# Patient Record
Sex: FEMALE | Race: WHITE | Employment: PART TIME | URBAN - METROPOLITAN AREA
[De-identification: names, ages, dates, MRNs, and addresses within clinical notes are randomized per-mention and may not be internally consistent; named-entity substitution may affect disease eponyms.]

---

## 2018-09-03 ENCOUNTER — HOSPITAL ENCOUNTER (EMERGENCY)
Facility: CLINIC | Age: 38
Discharge: HOME | End: 2018-09-03
Attending: EMERGENCY MEDICINE | Admitting: EMERGENCY MEDICINE
Payer: COMMERCIAL

## 2018-09-03 VITALS
OXYGEN SATURATION: 98 % | SYSTOLIC BLOOD PRESSURE: 100 MMHG | TEMPERATURE: 98.4 F | HEART RATE: 109 BPM | DIASTOLIC BLOOD PRESSURE: 69 MMHG | RESPIRATION RATE: 16 BRPM

## 2018-09-03 DIAGNOSIS — M54.16 LUMBAR RADICULOPATHY: ICD-10-CM

## 2018-09-03 PROCEDURE — 96372 THER/PROPH/DIAG INJ SC/IM: CPT

## 2018-09-03 PROCEDURE — 99284 EMERGENCY DEPT VISIT MOD MDM: CPT | Mod: 25

## 2018-09-03 PROCEDURE — 25000128 H RX IP 250 OP 636: Performed by: EMERGENCY MEDICINE

## 2018-09-03 PROCEDURE — 25000132 ZZH RX MED GY IP 250 OP 250 PS 637: Performed by: EMERGENCY MEDICINE

## 2018-09-03 RX ORDER — KETOROLAC TROMETHAMINE 30 MG/ML
30 INJECTION, SOLUTION INTRAMUSCULAR; INTRAVENOUS ONCE
Status: COMPLETED | OUTPATIENT
Start: 2018-09-03 | End: 2018-09-03

## 2018-09-03 RX ORDER — OXYCODONE HYDROCHLORIDE 5 MG/1
10 TABLET ORAL ONCE
Status: COMPLETED | OUTPATIENT
Start: 2018-09-03 | End: 2018-09-03

## 2018-09-03 RX ORDER — OXYCODONE AND ACETAMINOPHEN 5; 325 MG/1; MG/1
1-2 TABLET ORAL EVERY 4 HOURS PRN
Qty: 5 TABLET | Refills: 0 | Status: SHIPPED | OUTPATIENT
Start: 2018-09-03

## 2018-09-03 RX ADMIN — KETOROLAC TROMETHAMINE 30 MG: 30 INJECTION, SOLUTION INTRAMUSCULAR at 14:03

## 2018-09-03 RX ADMIN — OXYCODONE HYDROCHLORIDE 10 MG: 5 TABLET ORAL at 15:08

## 2018-09-03 ASSESSMENT — ENCOUNTER SYMPTOMS
WEAKNESS: 1
BACK PAIN: 1
FEVER: 0
NUMBNESS: 0
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
## 2018-09-03 NOTE — ED AVS SNAPSHOT
[facility] Emergency Department    [address]    [facility] [address]    Phone:  [phone]    Fax:  [phone]                                       Audrey Cobian   MRN: [id number]    Department:  [facility] Emergency Department   Date of Visit:  9/3/2018           Patient Information     Date Of Birth          1980        Your diagnoses for this visit were:     Lumbar radiculopathy        You were seen by Sourav Ken MD.      Follow-up Information     Follow up with [facility] Emergency Department.    Specialty:  EMERGENCY MEDICINE    Why:  As needed, If symptoms worsen    Contact information:    [address]  [facility] [address] [phone]        Follow up with Ana Alvarado Schedule an appointment as soon as possible for a visit in 1 day.    Specialty:  Nurse Practitioner    Contact information:    [facility]  [address] [facility] [address]  [phone]          Discharge Instructions       Discharge Instructions  Back Pain  You were seen today for back pain. Back pain can have many causes, but most will get better without surgery or other specific treatment. Sometimes there is a herniated ( slipped ) disc. We do not usually do MRI scans to look for these right away, since most herniated discs will get better on their own with time.  Today, we did not find any evidence that your back pain was caused by a serious condition. However, sometimes symptoms develop over time and cannot be found during an emergency visit, so it is very important that you follow up with your primary provider.  Generally, every Emergency Department visit should have a follow-up clinic visit with either a primary or a specialty clinic/provider. Please follow-up as instructed by your emergency provider today.    Return to the Emergency Department if:    You develop a fever with your back pain.     You have weakness or  change in sensation in one or both legs.    You lose control of your bowels or bladder, or cannot empty your bladder (cannot pee).    Your pain gets much worse.     Follow-up with your provider:    Unless your pain has completely gone away, please make an appointment with your provider within one week. Most of the routine care for back pain is available in a clinic and not the Emergency Department. You may need further management of your back pain, such as more pain medication, imaging such as an X-ray or MRI, or physical therapy.    What can I do to help myself?    Remain Active -- People are often afraid that they will hurt their back further or delay recovery by remaining active, but this is one of the best things you can do for your back. In fact, staying in bed for a long time to rest is not recommended. Studies have shown that people with low back pain recover faster when they remain active. Movement helps to bring blood flow to the muscles and relieve muscle spasms as well as preventing loss of muscle strength.    Heat -- Using a heating pad can help with low back pain during the first few weeks. Do not sleep with a heating pad, as you can be burned.     Pain medications - You may take a pain medication such as Tylenol  (acetaminophen), Advil , Motrin  (ibuprofen) or Aleve  (naproxen).  If you were given a prescription for medicine here today, be sure to read all of the information (including the package insert) that comes with your prescription.  This will include important information about the medicine, its side effects, and any warnings that you need to know about.  The pharmacist who fills the prescription can provide more information and answer questions you may have about the medicine.  If you have questions or concerns that the pharmacist cannot address, please call or return to the Emergency Department.   Remember that you can always come back to the Emergency Department if you are not able to see  your regular provider in the amount of time listed above, if you get any new symptoms, or if there is anything that worries you.      24 Hour Appointment Hotline       To make an appointment at any [facility], call [phone] ([phone]). If you don't have a family doctor or clinic, we will help you find one. [facility] clinics are conveniently located to serve the needs of you and your family.             Review of your medicines      START taking        Dose / Directions Last dose taken    oxyCODONE-acetaminophen 5-325 MG per tablet   Commonly known as:  PERCOCET   Dose:  1-2 tablet   Quantity:  5 tablet        Take 1-2 tablets by mouth every 4 hours as needed for pain   Refills:  0          Our records show that you are taking the medicines listed below. If these are incorrect, please call your family doctor or clinic.        Dose / Directions Last dose taken    ADDERALL XR PO   Dose:  20 mg        Take 20 mg by mouth daily   Refills:  0        CELEXA PO   Dose:  20 mg        Take 20 mg by mouth daily   Refills:  0        HYDROcodone-acetaminophen 7.5-325 MG per tablet   Commonly known as:  NORCO   Dose:  1 tablet        Take 1 tablet by mouth every 6 hours as needed for moderate to severe pain   Refills:  0        insulin aspart 100 UNIT/ML injection   Commonly known as:  NovoLOG PEN   Quantity:  9 mL        Take 1 unit insulin per 10 grams of carbohydrates.   Refills:  0        insulin glargine 100 UNIT/ML injection   Commonly known as:  LANTUS   Dose:  30 Units   Quantity:  9 mL        Inject 30 Units Subcutaneous every morning   Refills:  0        insulin pen needle 31G X 8 MM   Quantity:  100 each        1 Box of 100 insulin pen needles to be dispensed with every insulin pen prescription   Refills:  0        METHOCARBAMOL PO   Dose:  750 mg        Take 750 mg by mouth daily as needed   Refills:  0                Information about OPIOIDS     PRESCRIPTION OPIOIDS: WHAT YOU NEED TO KNOW   We gave  you an opioid (narcotic) pain medicine. It is important to manage your pain, but opioids are not always the best choice. You should first try all the other options your care team gave you. Take this medicine for as short a time (and as few doses) as possible.    Some activities can increase your pain, such as bandage changes or therapy sessions. It may help to take your pain medicine 30 to 60 minutes before these activities. Reduce your stress by getting enough sleep, working on hobbies you enjoy and practicing relaxation or meditation. Talk to your care team about ways to manage your pain beyond prescription opioids.    These medicines have risks:    DO NOT drive when on new or higher doses of pain medicine. These medicines can affect your alertness and reaction times, and you could be arrested for driving under the influence (DUI). If you need to use opioids long-term, talk to your care team about driving.    DO NOT operate heavy machinery    DO NOT do any other dangerous activities while taking these medicines.    DO NOT drink any alcohol while taking these medicines.     If the opioid prescribed includes acetaminophen, DO NOT take with any other medicines that contain acetaminophen. Read all labels carefully. Look for the word  acetaminophen  or  Tylenol.  Ask your pharmacist if you have questions or are unsure.    You can get addicted to pain medicines, especially if you have a history of addiction (chemical, alcohol or substance dependence). Talk to your care team about ways to reduce this risk.    All opioids tend to cause constipation. Drink plenty of water and eat foods that have a lot of fiber, such as fruits, vegetables, prune juice, apple juice and high-fiber cereal. Take a laxative (Miralax, milk of magnesia, Colace, Senna) if you don t move your bowels at least every other day. Other side effects include upset stomach, sleepiness, dizziness, throwing up, tolerance (needing more of the medicine to have  the same effect), physical dependence and slowed breathing.    Store your pills in a secure place, locked if possible. We will not replace any lost or stolen medicine. If you don t finish your medicine, please throw away (dispose) as directed by your pharmacist. The [place] [employer] has more information about safe disposal: [email or web address]://[email or web address]-green/[email or web address]-unwanted-medications        Prescriptions were sent or printed at these locations (1 Prescription)                   Other Prescriptions                Printed at Department/Unit printer (1 of 1)         oxyCODONE-acetaminophen (PERCOCET) 5-325 MG per tablet                Orders Needing Specimen Collection     None      Pending Results     No orders found from 9/1/2018 to 9/4/2018.            Pending Culture Results     No orders found from 9/1/2018 to 9/4/2018.            Pending Results Instructions     If you had any lab results that were not finalized at the time of your Discharge, you can call the ED Lab Result RN at [phone]. You will be contacted by this team for any positive Lab results or changes in treatment. The nurses are available 7 days a week from 10A to 6:30P.  You can leave a message 24 hours per day and they will return your call.        Test Results From Your Hospital Stay               Clinical Quality Measure: Blood Pressure Screening     Your blood pressure was checked while you were in the emergency department today. The last reading we obtained was  BP: 100/69 . Please read the guidelines below about what these numbers mean and what you should do about them.  If your systolic blood pressure (the top number) is less than 120 and your diastolic blood pressure (the bottom number) is less than 80, then your blood pressure is normal. There is nothing more that you need to do about it.  If your systolic blood pressure (the top number) is 120-139 or your diastolic blood pressure (the bottom number) is 80-89,  "your blood pressure may be higher than it should be. You should have your blood pressure rechecked within a year by a primary care provider.  If your systolic blood pressure (the top number) is 140 or greater or your diastolic blood pressure (the bottom number) is 90 or greater, you may have high blood pressure. High blood pressure is treatable, but if left untreated over time it can put you at risk for heart attack, stroke, or kidney failure. You should have your blood pressure rechecked by a primary care provider within the next 4 weeks.  If your provider in the emergency department today gave you specific instructions to follow-up with your doctor or provider even sooner than that, you should follow that instruction and not wait for up to 4 weeks for your follow-up visit.        Thank you for choosing [facility]       Thank you for choosing [facility] for your care. Our goal is always to provide you with excellent care. Hearing back from our patients is one way we can continue to improve our services. Please take a few minutes to complete the written survey that you may receive in the mail after you visit with us. Thank you!        [facility] Information     [facility] lets you send messages to your doctor, view your test results, renew your prescriptions, schedule appointments and more. To sign up, go to [email or web address] . Click on \"Log in\" on the left side of the screen, which will take you to the Welcome page. Then click on \"Sign up Now\" on the right side of the page.     You will be asked to enter the access code listed below, as well as some personal information. Please follow the directions to create your username and password.     Your access code is: [email or web address]  Expires: 2018  3:42 PM     Your access code will  in 90 days. If you need help or a new code, please call your [facility] clinic or [phone].        Care EveryWhere ID     This is your Care EveryWhere ID. This could be used by other " organizations to access your [facility] [place] medical records  [id number]        Equal Access to Services     JOSHUA WILKES : Nikos Morales, demarcus abel, suzette hurtado. So [facility] [phone].    ATENCIÓN: Si habla español, tiene a alfredo disposición servicios gratuitos de asistencia lingüística. Llame al [phone].    We comply with applicable federal civil rights laws and [place] laws. We do not discriminate on the basis of race, color, national origin, age, disability, sex, sexual orientation, or gender identity.            After Visit Summary       This is your record. Keep this with you and show to your community pharmacist(s) and doctor(s) at your next visit.

## 2019-01-10 ENCOUNTER — OFFICE VISIT (OUTPATIENT)
Dept: URGENT CARE | Facility: URGENT CARE | Age: 39
End: 2019-01-10
Payer: COMMERCIAL

## 2019-01-10 VITALS
OXYGEN SATURATION: 100 % | TEMPERATURE: 98.1 F | BODY MASS INDEX: 19.99 KG/M2 | RESPIRATION RATE: 18 BRPM | HEART RATE: 69 BPM | SYSTOLIC BLOOD PRESSURE: 102 MMHG | WEIGHT: 122 LBS | DIASTOLIC BLOOD PRESSURE: 74 MMHG

## 2019-01-10 DIAGNOSIS — R07.0 THROAT PAIN: Primary | ICD-10-CM

## 2019-01-10 LAB
DEPRECATED S PYO AG THROAT QL EIA: NORMAL
SPECIMEN SOURCE: NORMAL

## 2019-01-10 PROCEDURE — 87880 STREP A ASSAY W/OPTIC: CPT | Performed by: PHYSICIAN ASSISTANT

## 2019-01-10 PROCEDURE — 99203 OFFICE O/P NEW LOW 30 MIN: CPT | Performed by: PHYSICIAN ASSISTANT

## 2019-01-10 PROCEDURE — 87081 CULTURE SCREEN ONLY: CPT | Performed by: PHYSICIAN ASSISTANT

## 2019-01-10 RX ORDER — INSULIN LISPRO 100 U/ML
INJECTION, SOLUTION SUBCUTANEOUS
Refills: 0 | COMMUNITY
Start: 2018-12-10

## 2019-01-10 ASSESSMENT — ENCOUNTER SYMPTOMS
SORE THROAT: 1
DIARRHEA: 0
CHILLS: 0
VOMITING: 0
COUGH: 1
FEVER: 0

## 2019-01-11 LAB
BACTERIA SPEC CULT: NORMAL
SPECIMEN SOURCE: NORMAL

## 2019-01-11 NOTE — PROGRESS NOTES
SUBJECTIVE:   Audrey Cobian is a 38 year old female presenting with a chief complaint of   Chief Complaint   Patient presents with     Urgent Care     Pharyngitis     Sore throat started 3 days ago the right side, painful to swallow, having back surgery on Tuesday for back, type 1     Cough       She is a new patient of [facility].    URI Adult    Onset of symptoms was 2-3 days ago.  Course of illness is same.    Severity moderate  Current and Associated symptoms: sore throat, slight cough  Treatment measures tried include None tried.  Predisposing factors include None.  Denies f/c/n/v/d.  She is T1DM. Blood sugars have been stable.      Review of Systems   Constitutional: Negative for chills and fever.   HENT: Positive for sore throat.    Respiratory: Positive for cough (slight).    Gastrointestinal: Negative for diarrhea and vomiting.       Past Medical History:   Diagnosis Date     Anxiety      Depressive disorder      Diabetes (H)      History reviewed. No pertinent family history.  Current Outpatient Medications   Medication Sig Dispense Refill     ADMELOG SOLOSTAR 100 UNIT/ML pen   0     Amphetamine-Dextroamphetamine (ADDERALL XR PO) Take 20 mg by mouth daily       Citalopram Hydrobromide (CELEXA PO) Take 20 mg by mouth daily        insulin pen needle 31G X 8 MM 1 Box of 100 insulin pen needles to be dispensed with every insulin pen prescription 100 each 0     HYDROcodone-acetaminophen (NORCO) 7.5-325 MG per tablet Take 1 tablet by mouth every 6 hours as needed for moderate to severe pain       insulin aspart (NOVOLOG PEN) 100 UNIT/ML injection Take 1 unit insulin per 10 grams of carbohydrates. (Patient not taking: Reported on 1/10/2019) 9 mL 0     insulin glargine (LANTUS) 100 UNIT/ML injection Inject 30 Units Subcutaneous every morning (Patient not taking: Reported on 1/10/2019) 9 mL 0     METHOCARBAMOL PO Take 750 mg by mouth daily as needed        oxyCODONE-acetaminophen (PERCOCET) 5-325 MG per  tablet Take 1-2 tablets by mouth every 4 hours as needed for pain (Patient not taking: Reported on 1/10/2019) 5 tablet 0     Social History     Tobacco Use     Smoking status: Current Every Day Smoker     Smokeless tobacco: Never Used   Substance Use Topics     Alcohol use: Yes     Comment: once a month       OBJECTIVE  /74   Pulse 69   Temp 98.1  F (36.7  C) (Oral)   Resp 18   Wt 55.3 kg (122 lb)   SpO2 100%   BMI 19.99 kg/m      Physical Exam   Constitutional: She appears well-developed and well-nourished. No distress.   HENT:   Head: Normocephalic and atraumatic.   Right Ear: Tympanic membrane normal.   Left Ear: Tympanic membrane normal.   Mouth/Throat: Oropharynx is clear and moist.   Eyes: Conjunctivae are normal.   Neck: Normal range of motion.   Cardiovascular: Regular rhythm and normal heart sounds.   Pulmonary/Chest: Effort normal and breath sounds normal. No respiratory distress. She has no wheezes. She has no rales.   Neurological: She is alert.   Skin: Skin is warm and dry.   Psychiatric: She has a normal mood and affect.       Labs:  Results for orders placed or performed in visit on 01/10/19 (from the past 24 hour(s))   Strep, Rapid Screen   Result Value Ref Range    Specimen Description Throat     Rapid Strep A Screen       NEGATIVE: No Group A streptococcal antigen detected by immunoassay, await culture report.           ASSESSMENT:      ICD-10-CM    1. Throat pain R07.0 Strep, Rapid Screen     Beta strep group A culture          PLAN:    Acute pharyngitis: Rapid strep is negative today.  Throat culture is pending.  Supportive care measures advised.  We will communicate any positive finding on the throat culture result.  Follow-up if any worsening symptoms.  Patient understands and agrees with the plan.      Followup:    If not improving or if condition worsens, follow up with your Primary Care Provider

## 2021-06-11 ENCOUNTER — HOSPITAL ENCOUNTER (EMERGENCY)
Facility: CLINIC | Age: 41
Discharge: HOME | End: 2021-06-11
Attending: PHYSICIAN ASSISTANT | Admitting: PHYSICIAN ASSISTANT
Payer: COMMERCIAL

## 2021-06-11 ENCOUNTER — APPOINTMENT (OUTPATIENT)
Dept: GENERAL RADIOLOGY | Facility: CLINIC | Age: 41
End: 2021-06-11
Attending: PHYSICIAN ASSISTANT
Payer: COMMERCIAL

## 2021-06-11 VITALS
HEART RATE: 91 BPM | SYSTOLIC BLOOD PRESSURE: 103 MMHG | BODY MASS INDEX: 19.16 KG/M2 | RESPIRATION RATE: 18 BRPM | TEMPERATURE: 98.4 F | WEIGHT: 115 LBS | HEIGHT: 65 IN | DIASTOLIC BLOOD PRESSURE: 74 MMHG | OXYGEN SATURATION: 99 %

## 2021-06-11 DIAGNOSIS — M85.80 EROSION OF BONE: ICD-10-CM

## 2021-06-11 DIAGNOSIS — M79.672 LEFT FOOT PAIN: ICD-10-CM

## 2021-06-11 PROCEDURE — 99284 EMERGENCY DEPT VISIT MOD MDM: CPT

## 2021-06-11 PROCEDURE — 73630 X-RAY EXAM OF FOOT: CPT | Mod: LT

## 2021-06-11 RX ORDER — NAPROXEN 500 MG/1
500 TABLET ORAL 2 TIMES DAILY WITH MEALS
Qty: 14 TABLET | Refills: 0 | Status: SHIPPED | OUTPATIENT
Start: 2021-06-11 | End: 2021-06-18

## 2021-06-11 ASSESSMENT — MIFFLIN-ST. JEOR: SCORE: 1202.52

## 2021-06-11 ASSESSMENT — ENCOUNTER SYMPTOMS: ARTHRALGIAS: 1

## 2021-06-11 NOTE — ED NOTES
Patient alert and oriented. Respirations even and unlabored. All discharge education given. All questions answered. All medications explained in detail. Patient returned demonstration of proper use of crutches. Patient denies further needs and states that they are ready to leave. Patient ambulated out of the ER with steady gait.

## 2021-06-11 NOTE — LETTER
June 11, 2021      To Whom It May Concern:      Audrey Cobian was seen in our Emergency Department today, 06/11/21.  I expect her condition to improve over the next 2-3 days.  She may return to work when improved.    Sincerely,        Angelina Hidalgo PA-C

## 2021-06-11 NOTE — ED TRIAGE NOTES
Pt presents for concern of left lateral foot pain. Pt states the pain became severe last night at approx 2000. Pt states she does spend a lot of time on her feet for her job and does regularly have foot pain. Pt states that last night it was worse and hasn't improved like it normally does. States painful with ambulation. ABC intact, A&Ox4.

## 2021-06-11 NOTE — ED PROVIDER NOTES
"  History   Chief Complaint:  Foot Pain       HPI   Audrey Cobian is a 40 year old female with history of type 2 diabetes who presents with left out foot pain with difficulty bearing weight. She works long hours on her feet and has been experiencing ongoing pain for a while. Yesterday the pain worsened after she spent 13 hours standing. She has tried stretching, icing, ibuprofen and acetaminophen without relief. She denies any trauma.    Review of Systems   Musculoskeletal: Positive for arthralgias (left foot) and gait problem.   All other systems reviewed and are negative.      Allergies:  The patient has no known allergies.     Medications:  Celexa  Adderall  Insulin  Norco    Past Medical History:    Anxiety  Depression  Type 2 diabetes  DKA  ADD  Lumbar disc herniation    Vitamin D deficiency     Past Surgical History:    [place] teeth extraction  Lumbar decompression     Social History:  Patient is accompanied by a friend.    Physical Exam     Patient Vitals for the past 24 hrs:   BP Temp Temp src Pulse Resp SpO2 Height Weight   06/11/21 1232 99/75 98.4  F (36.9  C) Oral 95 16 99 % 1.651 m (5' 5\") 52.2 kg (115 lb)       Physical Exam  Constitutional: well apparing   CV: 2+ DP and PT pulses, brisk distal cap refill  Pulm: No respiratory distress  MSK: Localized tenderness to the left 4th and 5th metatarsals. No overlying erythema, ecchymosis, or swelling. No open wound.   Neurological: Alert, attentive  5/5 strength to the bilateral lower extremity motor functions; sensation intact.   Skin: Skin is warm and dry. See MSK. No skin changes.   Psych: Normal mood and affect     Emergency Department Course   Imaging:  XR Foot Left G/E 3 Views  Fifth and first metatarsal head erosions. There may be  small erosions in the great toe proximal phalangeal base as well.  Joint space width is within normal limits. Possibilities therefore  include gout.  As read by Radiology.    Emergency Department " Course:    Reviewed:  I reviewed nursing notes, vitals, past medical history and care everywhere    Assessments:  1318 I obtained history and examined the patient as noted above.   1407 I rechecked the patient and explained findings. I answered all questions prior to discharge.    Disposition:  The patient was discharged to home.       Impression & Plan   CMS Diagnoses: None    Medical Decision Making:  Audrey Cobian is a 40 year old female presents emergency department with atraumatic left foot pain.  Patient reports a history of left foot pain after working long shifts, though pain from last night persisted prompting arrival to the emergency department.  X-ray negative for fracture, there does reveal fifth and first metatarsal head erosions.  There is no overlying erythema to suggest cellulitis or acute flare of gout, certainly, this could be early gout.  Will treat with anti-inflammatory and plan for close follow-up with orthopedics.  Walking boot provided for comfort.  I recommended rest, ice, elevation, and activity as tolerated.  Patient agrees with this plan understands reasons to return including fevers, spreading redness, or any other concerning symptoms develop.      Diagnosis:    ICD-10-CM    1. Left foot pain  M79.672    2. Erosion of bone  M85.80        Discharge Medications:  New Prescriptions    NAPROXEN (NAPROSYN) 500 MG TABLET    Take 1 tablet (500 mg) by mouth 2 times daily (with meals) for 7 days       Scribe Disclosure:  I, Charbel Jesus, am serving as a scribe at 2:08 PM on 6/11/2021 to document services personally performed by Angelina Hidalgo PA-C based on my observations and the provider's statements to me.            Angelina Hidalgo PA-C  06/11/21 [id number]

## 2021-06-11 NOTE — DISCHARGE INSTRUCTIONS
Naproxen as prescribed.   Rest, ice, elevation, weight bearing as tolerated.   Follow up closely with orthopedics. Return for any new/concerning symptoms including fevers, redness, and increasing pain.